# Patient Record
Sex: MALE | Race: WHITE | NOT HISPANIC OR LATINO | URBAN - METROPOLITAN AREA
[De-identification: names, ages, dates, MRNs, and addresses within clinical notes are randomized per-mention and may not be internally consistent; named-entity substitution may affect disease eponyms.]

---

## 2020-10-22 ENCOUNTER — OFFICE (OUTPATIENT)
Dept: URBAN - METROPOLITAN AREA TELEHEALTH 7 | Facility: TELEHEALTH | Age: 45
End: 2020-10-22

## 2020-10-22 VITALS — HEIGHT: 71 IN | WEIGHT: 230 LBS

## 2020-10-22 DIAGNOSIS — K57.32 DIVERTICULITIS OF LARGE INTESTINE WITHOUT PERFORATION OR ABS: ICD-10-CM

## 2020-10-22 PROCEDURE — 99204 OFFICE O/P NEW MOD 45 MIN: CPT | Mod: GQ | Performed by: PHYSICIAN ASSISTANT

## 2020-10-22 NOTE — SERVICEHPINOTES
PATIENT VERIFIED BY DATE OF BIRTH AND NAME. Patient has been consented for this telecommunication visit.   TITA OROPEZA   is a   45   year old male who is being seen in consultation at the request of   SOFYA KELLY   for recent diverticulitis. Patient had RLQ abdominal pain in mid-September and was ultimately diagnosed with diverticulitis. He had a CT showing sigmoid diverticulitis and he was treated with Cipro and Flagyl with relief of symptoms within a couple of days. He denies having had constipation. He has h/o occasional diarrhea but notes that since he stopped drinking alcohol a month ago, his bowel habits have been better and he has felt much better overall. Currently without any GI or other complaints. No prior diverticulitis diagnosis or prior colonoscopy.

## 2020-10-29 ENCOUNTER — OFFICE (OUTPATIENT)
Dept: URBAN - METROPOLITAN AREA CLINIC 102 | Facility: CLINIC | Age: 45
End: 2020-10-29

## 2020-10-29 PROCEDURE — 00038: CPT | Performed by: INTERNAL MEDICINE
